# Patient Record
Sex: MALE | Race: WHITE | URBAN - METROPOLITAN AREA
[De-identification: names, ages, dates, MRNs, and addresses within clinical notes are randomized per-mention and may not be internally consistent; named-entity substitution may affect disease eponyms.]

---

## 2022-01-01 ENCOUNTER — HOSPITAL ENCOUNTER (EMERGENCY)
Age: 67
End: 2022-06-11
Attending: EMERGENCY MEDICINE
Payer: MEDICARE

## 2022-01-01 VITALS — BODY MASS INDEX: 36.45 KG/M2 | WEIGHT: 275 LBS | HEIGHT: 73 IN

## 2022-01-01 DIAGNOSIS — I46.9 CARDIAC ARREST (HCC): Primary | ICD-10-CM

## 2022-01-01 PROCEDURE — 92950 HEART/LUNG RESUSCITATION CPR: CPT

## 2022-01-01 PROCEDURE — 99285 EMERGENCY DEPT VISIT HI MDM: CPT

## 2022-01-01 PROCEDURE — 6360000002 HC RX W HCPCS: Performed by: EMERGENCY MEDICINE

## 2022-01-01 RX ORDER — EPINEPHRINE 0.1 MG/ML
SYRINGE (ML) INJECTION DAILY PRN
Status: COMPLETED | OUTPATIENT
Start: 2022-01-01 | End: 2022-01-01

## 2022-01-01 RX ORDER — AMLODIPINE BESYLATE AND BENAZEPRIL HYDROCHLORIDE 10; 40 MG/1; MG/1
1 CAPSULE ORAL DAILY
COMMUNITY
Start: 2022-01-01

## 2022-01-01 RX ORDER — ROSUVASTATIN CALCIUM 5 MG/1
5 TABLET, COATED ORAL DAILY
COMMUNITY
Start: 2021-01-01

## 2022-01-01 RX ORDER — ALLOPURINOL 300 MG/1
300 TABLET ORAL DAILY
COMMUNITY

## 2022-01-01 RX ORDER — MINOCYCLINE HYDROCHLORIDE 100 MG/1
100 CAPSULE ORAL 2 TIMES DAILY PRN
COMMUNITY
Start: 2021-01-01

## 2022-01-01 RX ORDER — PREDNISONE 10 MG/1
TABLET ORAL
COMMUNITY
Start: 2022-01-01

## 2022-01-01 RX ORDER — HYDROCODONE BITARTRATE AND ACETAMINOPHEN 5; 325 MG/1; MG/1
1 TABLET ORAL 4 TIMES DAILY PRN
COMMUNITY
Start: 2022-01-01 | End: 2022-06-23

## 2022-01-01 RX ORDER — FENOFIBRATE 145 MG/1
145 TABLET, COATED ORAL DAILY
COMMUNITY
Start: 2022-01-01

## 2022-01-01 RX ORDER — ONDANSETRON 4 MG/1
TABLET, FILM COATED ORAL
COMMUNITY
Start: 2022-01-01

## 2022-01-01 RX ORDER — WARFARIN SODIUM 7.5 MG/1
7.5 TABLET ORAL
COMMUNITY
Start: 2021-01-01

## 2022-01-01 RX ORDER — METOPROLOL TARTRATE 100 MG/1
100 TABLET ORAL 2 TIMES DAILY
COMMUNITY

## 2022-01-01 RX ORDER — FUROSEMIDE 40 MG/1
40 TABLET ORAL DAILY
COMMUNITY
Start: 2021-01-01

## 2022-01-01 RX ORDER — ASPIRIN 81 MG/1
81 TABLET ORAL DAILY
COMMUNITY
Start: 2022-01-01

## 2022-01-01 RX ADMIN — EPINEPHRINE 1 MG: 0.1 INJECTION, SOLUTION ENDOTRACHEAL; INTRACARDIAC; INTRAVENOUS at 11:43

## 2022-06-11 NOTE — ED PROVIDER NOTES
Emergency Department Encounter    Patient: Paco Georges  MRN: [id number]  : 1955  Date of Evaluation: 2022  ED Provider:  Terri Villanueva MD      Triage Chief Complaint:   Cardiac Arrest (Pt arrived via [facility] they were called to home residence for SOB, pt was in resp distress en route when he lost pulse and breathing. S/P hip surgery recently. I/O inserted per EMS, airway and EPI x 1. Asytole vs PEA per EMS.)      [place]:  Paco Georges is a 73 y.o. male that presents for evaluation of cardiac arrest, the EMS was dispatched for respiratory arrest he is approximately 12 days postop left hip repair and was recently discharged on anticoagulations 25 members he was having increasing knee pain and leg pain he had sudden onset of severe dyspnea and leg pain followed by immediate cardiac arrest.  CPR was in progress and an end-tidal CO2 of 8 as per EMS with CPR. ROS:  Unable to fully obtain given clinical condition    No past medical history on file. Past Surgical History:   Procedure Laterality Date    JOINT REPLACEMENT       No family history on file.   Social History     Socioeconomic History    Marital status:      Spouse name: Not on file    Number of children: Not on file    Years of education: Not on file    Highest education level: Not on file   Occupational History    Not on file   Tobacco Use    Smoking status: Not on file    Smokeless tobacco: Not on file   Substance and Sexual Activity    Alcohol use: Not on file    Drug use: Not on file    Sexual activity: Not on file   Other Topics Concern    Not on file   Social History Narrative    Not on file     Social Determinants of Health     Financial Resource Strain:     Difficulty of Paying Living Expenses: Not on file   Food Insecurity:     Worried About Running Out of Food in the Last Year: Not on file    Alison of Food in the Last Year: Not on file   Transportation Needs:     Lack of Transportation (Medical): Not on file    Lack of Transportation (Non-Medical): Not on file   Physical Activity:     Days of Exercise per Week: Not on file    Minutes of Exercise per Session: Not on file   Stress:     Feeling of Stress : Not on file   Social Connections:     Frequency of Communication with Friends and Family: Not on file    Frequency of Social Gatherings with Friends and Family: Not on file    Attends Baptism Services: Not on file    Active Member of [address] [place] [address] or Organizations: Not on file    Attends Club or Organization Meetings: Not on file    Marital Status: Not on file   Intimate Partner Violence:     Fear of Current or Ex-Partner: Not on file    Emotionally Abused: Not on file    Physically Abused: Not on file    Sexually Abused: Not on file   Housing Stability:     Unable to Pay for Housing in the Last Year: Not on file    Number of [place] in the Last Year: Not on file    Unstable Housing in the Last Year: Not on file     No current facility-administered medications for this encounter. No current outpatient medications on file. No Known Allergies    Nursing Notes Reviewed    Physical Exam:    General appearance:  unresponsive  Skin: Cool extremities. Dry. No signs of trauma  Eye: Pupils are fixed and dilated  Ears, nose, mouth and throat:  Oral mucosa without foreign body  Neck:  Trachea midline. Extremity:  no trauma, cool extremities  Heart: no peripheral pulse, or cardiac activity  Perfusion:  cool extremities, delayed cap refill  Respiratory:  no spontaneous respirations, equal breath sounds with bagging via ET tube  Abdominal:  Non distended. no signs of trauma  Neurologic:  Unresponsive without response to pain in extremities    I have reviewed and interpreted all of the currently available lab results from this visit (if applicable):  No results found for this visit on 06/11/22. Radiographs (if obtained):    [] Radiologist's Report Reviewed:  No results found.       EKG (if obtained): (All EKG's are interpreted by myself in the absence of a cardiologist)    MDM:  Patient presents in full cardiac arrest of uncertain etiology. Prehospital interventions initiated per ACLS protocol. On Arrival  IO access initiated, IV fluids initiated, ACLS protocols begun. Patient's airway was secured  igel. Appropriate ACLS performed throughout patient's emergency department resuscitation including appropriate rate and depth of CPR, appropriate medications, CPR was maintained throughout the entire course along with appropriately timed pulse checks. Following resuscitation in the emergency department, multiple repeat bedside cardiac ultrasounds all of which revealed absence of cardiac activity, the patient was declared dead at 11:45    Clinical Impression:  1. Cardiac arrest [facility])      Disposition referral (if applicable):  No follow-up provider specified. Disposition medications (if applicable):  New Prescriptions    No medications on file       Comment: Please note this report has been produced using speech recognition software and may contain errors related to that system including errors in grammar, punctuation, and spelling, as well as words and phrases that may be inappropriate. Efforts were made to edit the dictations.       Calvin Flores MD  20/66/04 5523

## 2022-06-11 NOTE — ED NOTES
I spoke with Vik Navarro from [employer] office body is released.      Lul Hubbard RN  06/11/22 3590

## 2022-06-11 NOTE — ED NOTES
Called Dr. Geoffrey Robison office per Nikki Skaggs, RN @ [phone] need to speak with Dr. León Chacon for the a death certificate to be signed. Lilly Lamb, answering service for the PCP office stated Dr. Sergio Garzon is on-call for Dr. Anand Guy patients. Dr. Jared Almanzar will call back.       [employer]  06/11/22 [address] [facility]  06/11/22 1213

## 2022-06-11 NOTE — ED NOTES
Per wife the pt has been complaining of right knee pain since hip surgery on 5/30  Pt was never evaluated over the knee pain, pt thought it was gout per his wife  This am pt reported to his wife that he had the sensation of his \"right knee exploding\"  Pt got into the shower then started to have sob per wife, EMS was called. Pt has hx of DVT and was on coumadin 3 days per week per wife.       Mari Mensah RN  06/11/22 4102

## 2022-06-11 NOTE — ED NOTES
Family left  They do not know what  home they will use yet  They will contact.       Misha Bernard RN  22 0915

## 2022-06-11 NOTE — ED NOTES
I spoke with Dr Marcelina Alpers from [address] she is covering Dr Shikha Edward who did the pre-op physical.  Dr Jared Toro reports to send the death certificate for Dr Shikha Edward to mervat Cochran RN  06/11/22 [id number]
